# Patient Record
Sex: MALE | Race: WHITE | URBAN - METROPOLITAN AREA
[De-identification: names, ages, dates, MRNs, and addresses within clinical notes are randomized per-mention and may not be internally consistent; named-entity substitution may affect disease eponyms.]

---

## 2022-10-19 ENCOUNTER — OFFICE VISIT (OUTPATIENT)
Dept: DERMATOLOGY CLINIC | Facility: CLINIC | Age: 82
End: 2022-10-19
Payer: MEDICARE

## 2022-10-19 DIAGNOSIS — L30.9 DERMATITIS: Primary | ICD-10-CM

## 2022-10-19 PROCEDURE — 1126F AMNT PAIN NOTED NONE PRSNT: CPT | Performed by: STUDENT IN AN ORGANIZED HEALTH CARE EDUCATION/TRAINING PROGRAM

## 2022-10-19 PROCEDURE — 99204 OFFICE O/P NEW MOD 45 MIN: CPT | Performed by: STUDENT IN AN ORGANIZED HEALTH CARE EDUCATION/TRAINING PROGRAM

## 2022-10-19 RX ORDER — MYCOPHENOLATE MOFETIL 500 MG/1
500 TABLET ORAL
COMMUNITY
Start: 2021-07-13

## 2022-10-19 RX ORDER — AMLODIPINE BESYLATE 5 MG/1
TABLET ORAL
COMMUNITY
Start: 2022-10-18

## 2022-10-19 RX ORDER — TRAMADOL HYDROCHLORIDE 50 MG/1
TABLET ORAL
COMMUNITY
Start: 2022-04-26

## 2022-10-19 RX ORDER — PREDNISONE 10 MG/1
10 TABLET ORAL AS DIRECTED
COMMUNITY

## 2022-10-19 RX ORDER — TRIAMCINOLONE ACETONIDE 1 MG/G
CREAM TOPICAL
COMMUNITY
Start: 2022-08-08

## 2022-10-19 RX ORDER — FLUTICASONE PROPIONATE 50 MCG
2 SPRAY, SUSPENSION (ML) NASAL DAILY
COMMUNITY

## 2022-10-19 RX ORDER — ALFUZOSIN HYDROCHLORIDE 10 MG/1
TABLET, EXTENDED RELEASE ORAL
COMMUNITY
Start: 2021-08-13

## 2022-10-19 RX ORDER — CLOPIDOGREL BISULFATE 75 MG/1
75 TABLET ORAL DAILY
COMMUNITY

## 2022-10-19 RX ORDER — CLOTRIMAZOLE AND BETAMETHASONE DIPROPIONATE 10; .64 MG/G; MG/G
CREAM TOPICAL
COMMUNITY
Start: 2022-07-28

## 2022-10-19 RX ORDER — SULFAMETHOXAZOLE AND TRIMETHOPRIM 800; 160 MG/1; MG/1
800 TABLET ORAL
COMMUNITY
Start: 2021-07-02

## 2022-10-19 RX ORDER — TAMSULOSIN HYDROCHLORIDE 0.4 MG/1
0.4 CAPSULE ORAL DAILY
COMMUNITY

## 2022-10-19 RX ORDER — CLOBETASOL PROPIONATE 0.5 MG/G
1 CREAM TOPICAL 2 TIMES DAILY
Qty: 60 G | Refills: 3 | Status: SHIPPED | OUTPATIENT
Start: 2022-10-19 | End: 2023-10-19

## 2022-10-19 RX ORDER — ATORVASTATIN CALCIUM 80 MG/1
80 TABLET, FILM COATED ORAL DAILY
COMMUNITY

## 2022-10-19 RX ORDER — ZOLPIDEM TARTRATE 5 MG/1
5 TABLET ORAL
COMMUNITY

## 2022-10-19 RX ORDER — ROSUVASTATIN CALCIUM 40 MG/1
TABLET, COATED ORAL
COMMUNITY
Start: 2022-08-23

## 2022-10-19 RX ORDER — LOSARTAN POTASSIUM 100 MG/1
TABLET ORAL
COMMUNITY
Start: 2021-09-02

## 2022-10-19 RX ORDER — ASPIRIN 81 MG/1
81 TABLET, CHEWABLE ORAL DAILY
COMMUNITY

## 2022-10-19 RX ORDER — METHYLPREDNISOLONE 4 MG/1
TABLET ORAL
COMMUNITY
Start: 2022-08-11

## 2022-10-19 RX ORDER — NEOMYCIN SULFATE, POLYMYXIN B SULFATE AND DEXAMETHASONE 3.5; 10000; 1 MG/ML; [USP'U]/ML; MG/ML
SUSPENSION/ DROPS OPHTHALMIC
COMMUNITY
Start: 2022-05-17

## 2022-10-19 RX ORDER — LISINOPRIL 10 MG/1
TABLET ORAL
COMMUNITY

## 2022-10-19 RX ORDER — CHLORTHALIDONE 25 MG/1
TABLET ORAL
COMMUNITY
Start: 2021-09-02

## 2022-10-19 RX ORDER — FINASTERIDE 5 MG/1
TABLET, FILM COATED ORAL
COMMUNITY
Start: 2022-10-08

## 2022-10-19 NOTE — PROGRESS NOTES
October 19, 2022    New patient     Referred by:   No referring provider defined for this encounter. CHIEF COMPLAINT: Rash     HISTORY OF PRESENT ILLNESS: Jonatan Arora is a [de-identified]year old male here for evaluation of rash. Location: Back of bilateral legs  Duration: 2 months  Signs and symptoms: Itching  Current treatment: Triamcinolone     Biopsy demonstrating spongiotic dermatitis  Rash improved with triamcinolone but has continues dryness and itching    Personal Dermatologic History  History of chronic skin disease: No    Family History  History of chronic skin disease: No  History autoimmune diseases:  No    Past Medical History  History reviewed. No pertinent past medical history. REVIEW OF SYSTEMS:  Constitutional: Denies fever, chills, unintentional weight loss. Skin as per hospitals    Medications  No current outpatient medications on file. PHYSICAL EXAM:  General: awake, alert, no acute distress  Skin: Skin exam was performed today including the following: head and face, scalp, neck, chest (including breasts and axillae), abdomen, groin, buttocks, back, bilateral upper extremities, bilateral lower extremities, hands, feet, digits, nails. Pertinent findings include:   - Lower back, buttock, posterior thighs with xerosis and pink patches likely 2/2 ecoriation    ASSESSMENT & PLAN:  Pathophysiology of diagnoses discussed with patient. Therapeutic options reviewed. Risks, benefits, and alternatives discussed with patient. Instructions reviewed at length. 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 Take weekends off. Avoid use on face, breasts, groin, or axillae. Risks, benefits, and alternatives discussed with patient.     Return to Dermatology clinic as needed   Dk Wahl MD

## 2023-08-28 ENCOUNTER — APPOINTMENT (RX ONLY)
Dept: URBAN - METROPOLITAN AREA CLINIC 162 | Facility: CLINIC | Age: 83
Setting detail: DERMATOLOGY
End: 2023-08-28

## 2023-08-28 DIAGNOSIS — L29.8 OTHER PRURITUS: ICD-10-CM

## 2023-08-28 DIAGNOSIS — Z71.89 OTHER SPECIFIED COUNSELING: ICD-10-CM

## 2023-08-28 DIAGNOSIS — L29.89 OTHER PRURITUS: ICD-10-CM

## 2023-08-28 PROCEDURE — ? OTHER

## 2023-08-28 PROCEDURE — ? SUNSCREEN RECOMMENDATIONS

## 2023-08-28 PROCEDURE — ? COUNSELING

## 2023-08-28 PROCEDURE — ? PRESCRIPTION

## 2023-08-28 PROCEDURE — 99202 OFFICE O/P NEW SF 15 MIN: CPT

## 2023-08-28 RX ORDER — PREDNISONE 10 MG/1
TABLET ORAL
Qty: 40 | Refills: 0 | Status: ERX | COMMUNITY
Start: 2023-08-28

## 2023-08-28 RX ADMIN — PREDNISONE: 10 TABLET ORAL at 00:00

## 2023-08-28 NOTE — PROCEDURE: OTHER
Detail Level: Zone
Render Risk Assessment In Note?: no
Other (Free Text): Had bx done by another derm pt will have them fax results, [employer] info [place] sept [place]th
Note Text (......Xxx Chief Complaint.): This diagnosis correlates with the

## 2023-09-11 ENCOUNTER — APPOINTMENT (RX ONLY)
Dept: URBAN - METROPOLITAN AREA CLINIC 162 | Facility: CLINIC | Age: 83
Setting detail: DERMATOLOGY
End: 2023-09-11

## 2023-09-11 DIAGNOSIS — L29.8 OTHER PRURITUS: ICD-10-CM | Status: RESOLVING

## 2023-09-11 DIAGNOSIS — L23.3 ALLERGIC CONTACT DERMATITIS DUE TO DRUGS IN CONTACT WITH SKIN: ICD-10-CM | Status: RESOLVED

## 2023-09-11 DIAGNOSIS — L29.89 OTHER PRURITUS: ICD-10-CM | Status: RESOLVING

## 2023-09-11 DIAGNOSIS — Z71.89 OTHER SPECIFIED COUNSELING: ICD-10-CM

## 2023-09-11 PROCEDURE — ? PRESCRIPTION

## 2023-09-11 PROCEDURE — ? SUNSCREEN RECOMMENDATIONS

## 2023-09-11 PROCEDURE — ? OTHER

## 2023-09-11 PROCEDURE — 99213 OFFICE O/P EST LOW 20 MIN: CPT

## 2023-09-11 PROCEDURE — ? ADDITIONAL NOTES

## 2023-09-11 PROCEDURE — ? COUNSELING ALLERGENS

## 2023-09-11 PROCEDURE — ? COUNSELING

## 2023-09-11 RX ORDER — PREDNISONE 10 MG/1
TABLET ORAL
Qty: 40 | Refills: 0 | Status: ERX

## 2023-09-11 ASSESSMENT — BSA RASH: BSA RASH: 1

## 2023-09-11 ASSESSMENT — ITCH NUMERIC RATING SCALE: HOW SEVERE IS YOUR ITCHING?: 1

## 2023-09-11 ASSESSMENT — SEVERITY ASSESSMENT 2020: SEVERITY 2020: CLEAR

## 2023-09-11 NOTE — PROCEDURE: OTHER
Detail Level: Zone
Render Risk Assessment In Note?: no
Other (Free Text): Had bx done by another derm pt will have them fax results, [employer] info given [place] sept 19th
Note Text (......Xxx Chief Complaint.): This diagnosis correlates with the
Other (Free Text): Dermatitis is most likely due to the Rx for lisinopril. Patient will talk to his pcp about changing blood pressure medication.

## 2023-09-11 NOTE — PROCEDURE: ADDITIONAL NOTES
Detail Level: Simple
Render Risk Assessment In Note?: no
Additional Notes: Patient has been taking 10 mg of prednisone every day instead of the way it was directed. The dose prescribed was  tapered dose.